# Patient Record
Sex: MALE
[De-identification: names, ages, dates, MRNs, and addresses within clinical notes are randomized per-mention and may not be internally consistent; named-entity substitution may affect disease eponyms.]

---

## 2017-08-04 ENCOUNTER — HOSPITAL ENCOUNTER (EMERGENCY)
Dept: HOSPITAL 59 - ER | Age: 47
LOS: 1 days | Discharge: HOME | End: 2017-08-05
Payer: SELF-PAY

## 2017-08-04 DIAGNOSIS — M10.072: Primary | ICD-10-CM

## 2017-08-04 LAB
ALBUMIN SERPL-MCNC: 4.1 GM/DL (ref 3.5–5)
ALBUMIN/GLOB SERPL: 1.6 {RATIO} (ref 1.1–1.8)
ALP SERPL-CCNC: 80 U/L (ref 38–126)
ALT SERPL-CCNC: 46 U/L (ref 21–72)
ANION GAP SERPL CALC-SCNC: 8.9 MMOL/L (ref 7–16)
AST SERPL-CCNC: 18 U/L (ref 17–59)
BASOPHILS NFR BLD: 0.4 % (ref 0–6)
BILIRUB SERPL-MCNC: 0.94 MG/DL (ref 0.2–1.3)
BUN SERPL-MCNC: 12 MG/DL (ref 9–20)
CO2 SERPL-SCNC: 27.1 MMOL/L (ref 22–30)
CREAT SERPL-MCNC: 1 MG/DL (ref 0.66–1.25)
CRP SERPL-MCNC: 2.6 MG/DL (ref 0–0.9)
EOSINOPHIL NFR BLD: 2.8 % (ref 0–6)
ERYTHROCYTE [DISTWIDTH] IN BLOOD BY AUTOMATED COUNT: 12.6 % (ref 11.5–14.5)
EST GLOMERULAR FILTRATION RATE: > 60 ML/MIN
GLOBULIN SER-MCNC: 2.6 GM/DL (ref 1.4–4.8)
GLUCOSE SERPL-MCNC: 129 MG/DL (ref 70–110)
GRANULOCYTES NFR BLD: 68.5 % (ref 47–80)
HCT VFR BLD CALC: 43.9 % (ref 42–52)
HGB BLD-MCNC: 15.2 GM/DL (ref 14–18)
LYMPHOCYTES NFR BLD AUTO: 21.9 % (ref 16–45)
MCH RBC QN AUTO: 29.4 PG (ref 27–33)
MCHC RBC AUTO-ENTMCNC: 34.6 G/DL (ref 32–36)
MCV RBC AUTO: 84.9 FL (ref 81–97)
MONOCYTES NFR BLD: 6.4 % (ref 0–9)
PLATELET # BLD: 203 K/UL (ref 130–400)
PMV BLD AUTO: 10.6 FL (ref 7.4–10.4)
PROT SERPL-MCNC: 6.7 GM/DL (ref 6.3–8.2)
RBC # BLD AUTO: 5.17 M/UL (ref 4.4–5.7)
WBC # BLD AUTO: 10.8 K/UL (ref 4.2–12.2)

## 2017-08-04 PROCEDURE — 84550 ASSAY OF BLOOD/URIC ACID: CPT

## 2017-08-04 PROCEDURE — 85025 COMPLETE CBC W/AUTO DIFF WBC: CPT

## 2017-08-04 PROCEDURE — 85651 RBC SED RATE NONAUTOMATED: CPT

## 2017-08-04 PROCEDURE — 99283 EMERGENCY DEPT VISIT LOW MDM: CPT

## 2017-08-04 PROCEDURE — 86140 C-REACTIVE PROTEIN: CPT

## 2017-08-04 PROCEDURE — 80053 COMPREHEN METABOLIC PANEL: CPT

## 2017-08-04 NOTE — EMERGENCY DEPARTMENT RECORD
History of Present Illness





- General


Chief Complaint: Ankle/Foot Injury


Stated Complaint: L FOOT SWOLLEN AND SORE


Time Seen by Provider: 08/04/17 23:08


Source: Patient


Mode of Arrival: Ambulatory


Limitations: No limitations





- History of Present Illness


Initial Comments: 





46 yo male presents to ED with a CC of left foot swelling and pain that began 3 

days ago.  Patient denies injury/trauma, denies recent illness, fevers, chills, 

or illness.  Patient reports that his symptoms began with pain to the great toe 

and has radiated out to the remainder of the fore-foot.  Patient believes his 

symptoms may be related to gout.  Patient reports that he did did a lot of 

unhealthy foods and drank more alcohol than normal 5 days ago.  Patient denies 

health problems other than HTN.


MD Complaint: Other (toe pain)


Onset/Timing: 3


-: Days(s)


Injury: Toes: Left


Type of Injury: Unknown, Other


Severity: Moderate


Severity scale (1-10): 5


Improves With: Cold therapy, NSAID


Worsens With: Movement, Weight bearing


Context: Other


Associated Symptoms: Swelling, Able to partially bear weight





- Related Data


 Previous Rx's











 Medication  Instructions  Recorded


 


Clindamycin HCl 300 mg PO Q6H #28 capsule 08/05/17


 


Hydrocodone/Acetaminophen [Norco 1 each PO Q6H PRN #15 tablet 08/05/17





7.5-325 Tablet]  


 


Ibuprofen [Motrin] 800 mg PO Q6H #30 tab 08/05/17











 Allergies











Allergy/AdvReac Type Severity Reaction Status Date / Time


 


Penicillins Allergy Unknown PT UNSURE Unverified 03/30/17 08:16





   OF REACTION  














Travel Screening





- Travel/Exposure Within Last 30 Days


Have you traveled within the last 30 days?: No





- Travel Symptoms


Symptom Screening: Joint & Muscle Aches





Review of Systems


Constitutional: Denies: Chills, Fever, Malaise, Night sweats


Eyes: Denies: Eye discharge, Eye pain


ENT: Denies: Congestion, Ear pain, Epistaxis


Cardiovascular: Denies: Chest pain, Dyspnea on exertion


Endocrine: Denies: Fatigue, Heat or cold intolerance


Gastrointestinal: Denies: Abdominal pain, Nausea, Vomiting


Genitourinary: Denies: Incontinence, Retention


Musculoskeletal: Reports: Arthralgia, Joint swelling.  Denies: Back pain


Skin: Reports: Change in color.  Denies: Bruising, Change in hair/nails


Neurological: Denies: Abnormal gait, Confusion, Headache, Seizure


Psychiatric: Denies: Anxiety


Hematological/Lymphatic: Denies: Anemia, Blood Clots





Physical Exam





- General


General Appearance: Alert, Oriented x3, Cooperative, Mild distress


Limitations: No limitations





- Head


Head exam: Atraumatic, Normocephalic, Normal inspection


Head exam detail: negative: Abrasion, Contusion, Blood's sign, General 

tenderness, Hematoma





- Eye


Eye exam: Normal appearance.  negative: Conjunctival injection, Periorbital 

swelling, Periorbital tenderness, Scleral icterus





- ENT


Ear exam: negative: Auricular hematoma, Auricular trauma


Nasal Exam: negative: Active bleeding, Discharge, Dried blood, Foreign body


Mouth exam: negative: Drooling, Laceration, Tongue elevation





- Neck


Neck exam: Normal inspection.  negative: Meningismus, Tenderness





- Respiratory


Respiratory exam: Normal lung sounds bilaterally.  negative: Rales, Respiratory 

distress, Rhonchi, Stridor





- Cardiovascular


Cardiovascular Exam: Regular rate, Normal rhythm, Normal heart sounds





- GI/Abdominal


GI/Abdominal exam: Soft.  negative: Rebound, Rigid, Tenderness





- Rectal


Rectal exam: Deferred





- 


 exam: Deferred





- Extremities


Extremities exam: Tenderness, Other (TTP greastest over the left MTP joint with 

STS present, erythema over the dorsum of the fore-foot, strong DPP present).  

negative: Calf tenderness, Pedal edema





- Back


Back exam: Denies: CVA tenderness (R), CVA tenderness (L)





- Neurological


Neurological exam: Alert, Normal gait, Oriented X3





- Psychiatric


Psychiatric exam: Normal affect, Normal mood





- Skin


Skin exam: Erythema.  negative: Abrasion


Type of lesion: negative: abrasion





Course





 Vital Signs











  08/04/17





  23:02


 


Temperature 97.9 F


 


Pulse Rate [ 94 H





Pulse Ox Probe] 


 


Respiratory 20





Rate 


 


Blood Pressure 193/114





[Left Arm] 


 


Pulse Ox 96














- Reevaluation(s)


Reevaluation #1: 





08/05/17 00:07


Labs reviewed, Uric Acid 6.77, ESR 10, CRP 2.6.  Labs are otherwise grossly 

unremarkable for an acute process.


Patient reassessed and reports improvement in his pain symptoms.  Will treat 

for probable gout as well as possible cellulitis with Norco, Motrin 800 mg, and 

Clindamycin as directed with instructions to return to ED for any worsening of 

his symptoms.  Patient appears stable for discharge at this time.





Medical Decision Making





- Lab Data


Result diagrams: 


 08/04/17 23:20





 08/04/17 23:20





Disposition


Disposition: Discharge


Clinical Impression: 


Gout attack


Qualifiers:


 Gout site: toe Gout etiology: unspecified cause Laterality: left Qualified Code

(s): M10.9 - Gout, unspecified





Disposition: Home, Self-Care


Condition: (2) Stable


Instructions:  Gout (ED)


Additional Instructions: 


Return to ED if your symptoms worsen or if you have any concerns.


Norco, Clindamycin, and Motrin 800 mg as directed.


Follow-up with your family doctor in 3-5 days as directed.


Prescriptions: 


Clindamycin HCl 300 mg PO Q6H #28 capsule


Hydrocodone/Acetaminophen [Norco 7.5-325 Tablet] 1 each PO Q6H PRN #15 tablet


 PRN Reason: Pain - Moderate (5-7)


Ibuprofen [Motrin] 800 mg PO Q6H #30 tab


Forms:  Patient Portal Access


Time of Disposition: 00:14





Quality





- Quality Measures


Quality Measures: N/A





- Blood Pressure Screening


Blood Pressure Classification: Hypertensive Reading


Systolic Measurement: 165


Diastolic Measurement: 107


Screening for High Blood Pressure: < First Hypertensive BP, F/U Documented > [

]


First Hypertensive Follow-up Interventions: Referral to alternative/primary 

care provider.

## 2017-08-05 LAB — ERYTHROCYTE [SEDIMENTATION RATE] IN BLOOD: 10 MM/HR (ref 0–15)

## 2019-08-22 ENCOUNTER — HOSPITAL ENCOUNTER (OUTPATIENT)
Dept: HOSPITAL 59 - HOP | Age: 49
Discharge: HOME | End: 2019-08-22
Attending: INTERNAL MEDICINE
Payer: COMMERCIAL

## 2019-08-22 DIAGNOSIS — Z12.11: Primary | ICD-10-CM

## 2019-08-22 DIAGNOSIS — I10: ICD-10-CM

## 2019-08-22 DIAGNOSIS — D12.5: ICD-10-CM

## 2019-08-23 NOTE — OPERATIVE NOTE
OPERATION: COLONOSCOPY with cold forceps polypectomy. 



PREOPERATIVE DIAGNOSIS: Colon cancer screening. 



POSTOPERATIVE DIAGNOSIS: Diminutive sigmoid colon polyp. 



PROCEDURE: After informed consent was obtained from the patient, he was placed 
in the left lateral decubitus position in the endoscopy suite, sedated and 
monitored by the department of anesthesia. Digital rectal exam was unremarkable.
A well-lubricated [id number] colonoscope was inserted into the rectum and advanced 
to the cecum. The cecum, cecal bulb, ileocecal valve, ascending colon, 
transverse colon, and descending colon were free of inflammatory changes, mass 
lesions, or polyps. Forward and J-turn views of the rectum and anorectum were 
unremarkable. The endoscope was reinserted. The sigmoid colon was again 
inspected. There was a diminutive polyp in the sigmoid colon which was removed 
with a cold forceps. Minimal bleeding was noted at the site. The endoscope was 
then retracted throughout the course of the distal colon. No new findings or 
abnormalities were identified. 



RECOMMENDATIONS: The patient should resume his medications and diet. He will 
require repeat exam in 5-10 years pending tissue histology. 



As always, thank you for allowing me to participate in the healthcare of your 
patients. 

NENO